# Patient Record
Sex: MALE | Race: OTHER | NOT HISPANIC OR LATINO
[De-identification: names, ages, dates, MRNs, and addresses within clinical notes are randomized per-mention and may not be internally consistent; named-entity substitution may affect disease eponyms.]

---

## 2020-08-03 PROBLEM — Z00.00 ENCOUNTER FOR PREVENTIVE HEALTH EXAMINATION: Noted: 2020-08-03

## 2020-08-11 ENCOUNTER — APPOINTMENT (OUTPATIENT)
Dept: UROLOGY | Facility: CLINIC | Age: 50
End: 2020-08-11
Payer: MEDICAID

## 2020-08-11 VITALS
BODY MASS INDEX: 24.43 KG/M2 | DIASTOLIC BLOOD PRESSURE: 75 MMHG | WEIGHT: 152 LBS | TEMPERATURE: 97.9 F | HEART RATE: 74 BPM | HEIGHT: 66 IN | SYSTOLIC BLOOD PRESSURE: 160 MMHG

## 2020-08-11 DIAGNOSIS — N28.89 OTHER SPECIFIED DISORDERS OF KIDNEY AND URETER: ICD-10-CM

## 2020-08-11 PROCEDURE — 99204 OFFICE O/P NEW MOD 45 MIN: CPT

## 2020-08-11 NOTE — PHYSICAL EXAM
[General Appearance - Well Nourished] : well nourished [General Appearance - Well Developed] : well developed [General Appearance - In No Acute Distress] : no acute distress [Normal Appearance] : normal appearance [Well Groomed] : well groomed [Respiration, Rhythm And Depth] : normal respiratory rhythm and effort [Edema] : no peripheral edema [] : no respiratory distress [Exaggerated Use Of Accessory Muscles For Inspiration] : no accessory muscle use [Abdomen Soft] : soft [Costovertebral Angle Tenderness] : no ~M costovertebral angle tenderness [Abdomen Tenderness] : non-tender [Scrotum] : the scrotum was normal [Urinary Bladder Findings] : the bladder was normal on palpation [Urethral Meatus] : meatus normal [No Focal Deficits] : no focal deficits [Testes Mass (___cm)] : there were no testicular masses [Affect] : the affect was normal [Mood] : the mood was normal [Oriented To Time, Place, And Person] : oriented to person, place, and time [Not Anxious] : not anxious [No Palpable Adenopathy] : no palpable adenopathy

## 2020-08-11 NOTE — HISTORY OF PRESENT ILLNESS
[FreeTextEntry1] : CC: Renal mass\par \par HPI:  50 year old man with 3.6 cm exophytic right renal mass.  Lost 15 over 2 months.  He has a sense of dehydration. \par \par FAMHX: No  cancer in family\par SURGHX: None \par SOCIAL: , single, no children, originally from [facility], immigrated 20 years ago\par ROS: Weight loss, otherwise negative 10 system; neg ortho, neurologic. \par

## 2020-08-11 NOTE — ASSESSMENT
[FreeTextEntry1] : We reviewed the possible underlying histology of solid enhancing renal masses, with the majority being malignant (~80%) whereas ~20% are benign (e.g. oncocytoma).  We discussed the role/possibility of percutaneous biopsy, with the associated risks, benefits, complications, and accuracy issues (e.g. risk of false negative results). \par \par The heterogeneous natural history/biology of renal cell carcinoma was discussed, including the fact that while many renal cancers are indolent, others behave aggresssively.  \par \par Options were reviewed including, not limited to, active surveillance (AS), surgical extirpation and ablation.  The risks of tumor growth and metastasis on active surveillance were reviewed, including the fact that metastatic progression on AS could mean missing the opportunity for cure.  The average growth rate of ~2-3 mm/year and metastasis rates of ~2-3% on AS over 5 year interval for small renal masses <4 cm was discussed. \par \par With respect to treatment we reviewed ablation (cryosurgery, radiofrequency ablation), risks of recurrence, opportunities for salvage treatment, and imaging requirements for follow up.  Oncologic outcomes for ablation were reviewed. \par \par With respect to surgery we reviewed nephron sparing surgery vs. radical nephrectomy, as well as open vs. minimally invasive surgical (MIS) approaches (e.g. laparoscopy and robotic assisted laparoscopic surgery).  Personal and institutional experience, as well as other published literature was reviewed.  Oncologic outcomes, renal functional outcomes were compared and contrasted between radical vs. NSS and open vs. MIS surgery.  Risks of acute kidney injury, medical/surgical chronic kidney disease (either exacerbation or new-onset), as well as risks of ESRD development were reviewed.   Risks of conversion from MIS to open surgery discussed.  Risks of converting from attempted partial nephrectomy to radical nephrectomy were discussed.  Risks of surgical complications were reviewed, including not limited to: bleeding//life-threatening hemorrhage, vascular/bowel/adjacent visceral organ injury, trocar/access injury, the possibility of recognized vs. unrecognized/delayed-recognition injury, risks of thermal/blunt/sharp/retraction injury, risks of DVT, PE, MI, death, risks of cardiopulmonary/anesthesia related complications, positional injury, infection/collection/abscess, wound complications/dehiscence/seroma/cellulitis, urinoma/fistula, ureteral injury/obstruction, as well as other complications\par

## 2020-08-21 PROBLEM — Z00.00 ENCOUNTER FOR PREVENTIVE HEALTH EXAMINATION: Status: ACTIVE | Noted: 2020-08-21

## 2020-08-31 ENCOUNTER — APPOINTMENT (OUTPATIENT)
Dept: UROLOGY | Facility: HOSPITAL | Age: 50
End: 2020-08-31

## 2023-02-08 PROBLEM — K21.9 GASTRO-ESOPHAGEAL REFLUX DISEASE WITHOUT ESOPHAGITIS: Chronic | Status: ACTIVE | Noted: 2020-08-28

## 2023-02-08 PROBLEM — M19.90 UNSPECIFIED OSTEOARTHRITIS, UNSPECIFIED SITE: Chronic | Status: ACTIVE | Noted: 2020-08-28

## 2023-02-08 PROBLEM — G47.33 OBSTRUCTIVE SLEEP APNEA (ADULT) (PEDIATRIC): Chronic | Status: ACTIVE | Noted: 2020-08-28

## 2023-02-14 ENCOUNTER — APPOINTMENT (OUTPATIENT)
Dept: UROLOGY | Facility: CLINIC | Age: 53
End: 2023-02-14
Payer: MEDICAID

## 2023-02-14 VITALS
OXYGEN SATURATION: 98 % | SYSTOLIC BLOOD PRESSURE: 165 MMHG | HEART RATE: 83 BPM | DIASTOLIC BLOOD PRESSURE: 75 MMHG | TEMPERATURE: 97.3 F

## 2023-02-14 DIAGNOSIS — N20.0 CALCULUS OF KIDNEY: ICD-10-CM

## 2023-02-14 PROCEDURE — 99203 OFFICE O/P NEW LOW 30 MIN: CPT

## 2023-02-15 NOTE — HISTORY OF PRESENT ILLNESS
[FreeTextEntry1] : Name ESTEPHANIE DUARTE \par MRN [id number] \par  May 24 1970 \par ------------------------------------------------------------------------------------------------------------------------------------------- \par Date of First Visit: 2023\par Referring Provider/PCP: ******** / ******** \par ------------------------------------------------------------------------------------------------------------------------------------------- \par CC: kidney stones\par \par History of Present Illness: ESTEPHANIE DUARTE is a 52 year old male with a history of right partial nephrectomy in  who presents for evaluation of kidney stones. He undergoes annual renal ultrasounds and recent imaging demonstrated a 6 mm nonobstructing calculus right interpolar which is new from imaging last year.  He has no history of kidney stones and has been asymptomatic. \par  \par Imaging: Renal US from 2023 (report only). Findings:  Stable post surgical changes right kidney. New 6mm nonobstructing calculus right interpolar. \par \par Previous urine cultures:  None \par  \par Kidney Stone History:  \par First-time stone former - Yes \par Concurrent asymptomatic stone(s) - Yes \par Previous stone surgeries - None \par Previous passed stones - None  \par Comorbidities - non-contributory \par Family history of kidney stones - None \par Previous metabolic evaluation - None

## 2023-02-15 NOTE — ASSESSMENT
[FreeTextEntry1] : Assessment:  \par ESTEPHANIE DUARTE is a 52 year old male with a history of right partial nephrectomy with a new 6mm right renal stone.\par  \par Plan:\par -CT stone hunt to further evaluate stone burden

## 2023-02-16 ENCOUNTER — OUTPATIENT (OUTPATIENT)
Dept: OUTPATIENT SERVICES | Facility: HOSPITAL | Age: 53
LOS: 1 days | End: 2023-02-16
Payer: MEDICAID

## 2023-02-16 ENCOUNTER — APPOINTMENT (OUTPATIENT)
Dept: CT IMAGING | Facility: HOSPITAL | Age: 53
End: 2023-02-16
Payer: MEDICAID

## 2023-02-16 DIAGNOSIS — Z98.84 BARIATRIC SURGERY STATUS: Chronic | ICD-10-CM

## 2023-02-16 DIAGNOSIS — Z90.89 ACQUIRED ABSENCE OF OTHER ORGANS: Chronic | ICD-10-CM

## 2023-02-16 DIAGNOSIS — Z41.9 ENCOUNTER FOR PROCEDURE FOR PURPOSES OTHER THAN REMEDYING HEALTH STATE, UNSPECIFIED: Chronic | ICD-10-CM

## 2023-02-16 DIAGNOSIS — N20.0 CALCULUS OF KIDNEY: ICD-10-CM

## 2023-02-16 PROCEDURE — 74176 CT ABD & PELVIS W/O CONTRAST: CPT | Mod: 26

## 2023-02-16 PROCEDURE — 74176 CT ABD & PELVIS W/O CONTRAST: CPT

## 2023-02-17 ENCOUNTER — NON-APPOINTMENT (OUTPATIENT)
Age: 53
End: 2023-02-17

## 2023-02-17 DIAGNOSIS — R97.20 ELEVATED PROSTATE, SPECIFIC ANTIGEN [PSA]: ICD-10-CM

## 2023-02-20 ENCOUNTER — APPOINTMENT (OUTPATIENT)
Dept: CT IMAGING | Facility: CLINIC | Age: 53
End: 2023-02-20

## 2023-02-22 ENCOUNTER — NON-APPOINTMENT (OUTPATIENT)
Age: 53
End: 2023-02-22

## 2023-02-22 LAB
APPEARANCE: CLEAR
BACTERIA: NEGATIVE
BILIRUBIN URINE: NEGATIVE
BLOOD URINE: NEGATIVE
COLOR: YELLOW
GLUCOSE QUALITATIVE U: NEGATIVE
HYALINE CASTS: 0 /LPF
KETONES URINE: NEGATIVE
LEUKOCYTE ESTERASE URINE: NEGATIVE
MICROSCOPIC-UA: NORMAL
NITRITE URINE: NEGATIVE
PH URINE: 5.5
PROTEIN URINE: ABNORMAL
PSA FREE FLD-MCNC: 34 %
PSA FREE SERPL-MCNC: 0.37 NG/ML
PSA SERPL-MCNC: 1.07 NG/ML
RED BLOOD CELLS URINE: 2 /HPF
SPECIFIC GRAVITY URINE: 1.03
SQUAMOUS EPITHELIAL CELLS: 0 /HPF
UROBILINOGEN URINE: NORMAL
WHITE BLOOD CELLS URINE: 1 /HPF

## 2023-02-23 LAB — BACTERIA UR CULT: NORMAL
